# Patient Record
Sex: MALE | Race: WHITE | NOT HISPANIC OR LATINO
[De-identification: names, ages, dates, MRNs, and addresses within clinical notes are randomized per-mention and may not be internally consistent; named-entity substitution may affect disease eponyms.]

---

## 2017-05-04 ENCOUNTER — APPOINTMENT (OUTPATIENT)
Dept: CARDIOLOGY | Facility: CLINIC | Age: 72
End: 2017-05-04

## 2017-05-26 ENCOUNTER — APPOINTMENT (OUTPATIENT)
Dept: CARDIOLOGY | Facility: CLINIC | Age: 72
End: 2017-05-26

## 2017-05-26 ENCOUNTER — OUTPATIENT (OUTPATIENT)
Dept: OUTPATIENT SERVICES | Facility: HOSPITAL | Age: 72
LOS: 1 days | End: 2017-05-26

## 2017-05-26 ENCOUNTER — EMERGENCY (EMERGENCY)
Facility: HOSPITAL | Age: 72
LOS: 1 days | End: 2017-05-26
Payer: OTHER MISCELLANEOUS

## 2017-05-26 PROCEDURE — 99284 EMERGENCY DEPT VISIT MOD MDM: CPT

## 2017-06-30 ENCOUNTER — APPOINTMENT (OUTPATIENT)
Dept: CARDIOLOGY | Facility: CLINIC | Age: 72
End: 2017-06-30

## 2017-07-19 ENCOUNTER — OUTPATIENT (OUTPATIENT)
Dept: OUTPATIENT SERVICES | Facility: HOSPITAL | Age: 72
LOS: 1 days | End: 2017-07-19

## 2017-08-14 ENCOUNTER — OUTPATIENT (OUTPATIENT)
Dept: OUTPATIENT SERVICES | Facility: HOSPITAL | Age: 72
LOS: 1 days | End: 2017-08-14

## 2017-09-01 ENCOUNTER — TRANSCRIPTION ENCOUNTER (OUTPATIENT)
Age: 72
End: 2017-09-01

## 2017-10-04 ENCOUNTER — RECORD ABSTRACTING (OUTPATIENT)
Age: 72
End: 2017-10-04

## 2017-10-04 DIAGNOSIS — N40.0 BENIGN PROSTATIC HYPERPLASIA WITHOUT LOWER URINARY TRACT SYMPMS: ICD-10-CM

## 2017-10-04 DIAGNOSIS — N52.9 MALE ERECTILE DYSFUNCTION, UNSPECIFIED: ICD-10-CM

## 2017-10-04 DIAGNOSIS — F41.9 ANXIETY DISORDER, UNSPECIFIED: ICD-10-CM

## 2017-10-04 DIAGNOSIS — Z84.89 FAMILY HISTORY OF OTHER SPECIFIED CONDITIONS: ICD-10-CM

## 2017-10-04 DIAGNOSIS — Z87.19 PERSONAL HISTORY OF OTHER DISEASES OF THE DIGESTIVE SYSTEM: ICD-10-CM

## 2017-10-04 DIAGNOSIS — Z80.1 FAMILY HISTORY OF MALIGNANT NEOPLASM OF TRACHEA, BRONCHUS AND LUNG: ICD-10-CM

## 2017-10-04 DIAGNOSIS — K21.9 GASTRO-ESOPHAGEAL REFLUX DISEASE W/OUT ESOPHAGITIS: ICD-10-CM

## 2017-10-04 DIAGNOSIS — Z78.9 OTHER SPECIFIED HEALTH STATUS: ICD-10-CM

## 2017-10-04 DIAGNOSIS — J45.909 UNSPECIFIED ASTHMA, UNCOMPLICATED: ICD-10-CM

## 2017-10-04 RX ORDER — MULTIVITAMIN WITH MINERALS
TABLET ORAL DAILY
Refills: 0 | Status: ACTIVE | COMMUNITY

## 2017-11-09 ENCOUNTER — APPOINTMENT (OUTPATIENT)
Dept: CARDIOLOGY | Facility: CLINIC | Age: 72
End: 2017-11-09
Payer: MEDICARE

## 2017-11-09 VITALS
SYSTOLIC BLOOD PRESSURE: 114 MMHG | WEIGHT: 228 LBS | BODY MASS INDEX: 29.26 KG/M2 | HEART RATE: 76 BPM | DIASTOLIC BLOOD PRESSURE: 56 MMHG | HEIGHT: 74 IN

## 2017-11-09 VITALS — SYSTOLIC BLOOD PRESSURE: 108 MMHG | DIASTOLIC BLOOD PRESSURE: 60 MMHG

## 2017-11-09 PROCEDURE — 99214 OFFICE O/P EST MOD 30 MIN: CPT

## 2017-11-17 ENCOUNTER — RX RENEWAL (OUTPATIENT)
Age: 72
End: 2017-11-17

## 2017-11-20 ENCOUNTER — OUTPATIENT (OUTPATIENT)
Dept: OUTPATIENT SERVICES | Facility: HOSPITAL | Age: 72
LOS: 1 days | End: 2017-11-20

## 2018-01-29 ENCOUNTER — OUTPATIENT (OUTPATIENT)
Dept: OUTPATIENT SERVICES | Facility: HOSPITAL | Age: 73
LOS: 1 days | End: 2018-01-29

## 2018-02-16 ENCOUNTER — APPOINTMENT (OUTPATIENT)
Dept: CARDIOLOGY | Facility: CLINIC | Age: 73
End: 2018-02-16
Payer: MEDICARE

## 2018-02-16 VITALS
DIASTOLIC BLOOD PRESSURE: 58 MMHG | SYSTOLIC BLOOD PRESSURE: 120 MMHG | HEIGHT: 74 IN | BODY MASS INDEX: 28.88 KG/M2 | WEIGHT: 225 LBS | HEART RATE: 74 BPM

## 2018-02-16 DIAGNOSIS — R73.03 PREDIABETES.: ICD-10-CM

## 2018-02-16 PROCEDURE — 99214 OFFICE O/P EST MOD 30 MIN: CPT

## 2018-02-17 PROBLEM — R73.03 PRE-DIABETES: Status: ACTIVE | Noted: 2018-02-17

## 2018-02-28 ENCOUNTER — OUTPATIENT (OUTPATIENT)
Dept: OUTPATIENT SERVICES | Facility: HOSPITAL | Age: 73
LOS: 1 days | End: 2018-02-28

## 2018-03-10 ENCOUNTER — OUTPATIENT (OUTPATIENT)
Dept: OUTPATIENT SERVICES | Facility: HOSPITAL | Age: 73
LOS: 1 days | End: 2018-03-10

## 2018-05-09 ENCOUNTER — APPOINTMENT (OUTPATIENT)
Dept: CARDIOLOGY | Facility: CLINIC | Age: 73
End: 2018-05-09
Payer: MEDICARE

## 2018-05-09 PROCEDURE — 93306 TTE W/DOPPLER COMPLETE: CPT

## 2018-05-10 ENCOUNTER — APPOINTMENT (OUTPATIENT)
Dept: CARDIOLOGY | Facility: CLINIC | Age: 73
End: 2018-05-10
Payer: MEDICARE

## 2018-05-10 VITALS
HEIGHT: 74 IN | BODY MASS INDEX: 28.88 KG/M2 | WEIGHT: 225 LBS | SYSTOLIC BLOOD PRESSURE: 110 MMHG | DIASTOLIC BLOOD PRESSURE: 58 MMHG | HEART RATE: 60 BPM

## 2018-05-10 DIAGNOSIS — M19.90 UNSPECIFIED OSTEOARTHRITIS, UNSPECIFIED SITE: ICD-10-CM

## 2018-05-10 DIAGNOSIS — Z86.79 PERSONAL HISTORY OF OTHER DISEASES OF THE CIRCULATORY SYSTEM: ICD-10-CM

## 2018-05-10 DIAGNOSIS — R42 DIZZINESS AND GIDDINESS: ICD-10-CM

## 2018-05-10 PROCEDURE — 99214 OFFICE O/P EST MOD 30 MIN: CPT

## 2018-05-10 PROCEDURE — 93000 ELECTROCARDIOGRAM COMPLETE: CPT

## 2018-05-17 ENCOUNTER — APPOINTMENT (OUTPATIENT)
Dept: CARDIOLOGY | Facility: CLINIC | Age: 73
End: 2018-05-17

## 2018-05-24 ENCOUNTER — RX RENEWAL (OUTPATIENT)
Age: 73
End: 2018-05-24

## 2018-06-12 ENCOUNTER — MEDICATION RENEWAL (OUTPATIENT)
Age: 73
End: 2018-06-12

## 2018-06-26 ENCOUNTER — EMERGENCY (EMERGENCY)
Facility: HOSPITAL | Age: 73
LOS: 1 days | End: 2018-06-26
Payer: MEDICARE

## 2018-06-26 PROCEDURE — 99284 EMERGENCY DEPT VISIT MOD MDM: CPT

## 2018-06-26 PROCEDURE — 71045 X-RAY EXAM CHEST 1 VIEW: CPT | Mod: 26

## 2018-08-03 ENCOUNTER — RX RENEWAL (OUTPATIENT)
Age: 73
End: 2018-08-03

## 2018-08-10 ENCOUNTER — OUTPATIENT (OUTPATIENT)
Dept: OUTPATIENT SERVICES | Facility: HOSPITAL | Age: 73
LOS: 1 days | End: 2018-08-10

## 2018-10-31 ENCOUNTER — MEDICATION RENEWAL (OUTPATIENT)
Age: 73
End: 2018-10-31

## 2018-11-15 ENCOUNTER — APPOINTMENT (OUTPATIENT)
Dept: CARDIOLOGY | Facility: CLINIC | Age: 73
End: 2018-11-15
Payer: MEDICARE

## 2018-11-15 VITALS
WEIGHT: 220 LBS | SYSTOLIC BLOOD PRESSURE: 106 MMHG | BODY MASS INDEX: 28.23 KG/M2 | HEIGHT: 74 IN | HEART RATE: 73 BPM | OXYGEN SATURATION: 98 % | DIASTOLIC BLOOD PRESSURE: 52 MMHG

## 2018-11-15 PROCEDURE — 99214 OFFICE O/P EST MOD 30 MIN: CPT

## 2018-11-15 RX ORDER — NEBIVOLOL HYDROCHLORIDE 5 MG/1
5 TABLET ORAL DAILY
Qty: 90 | Refills: 2 | Status: DISCONTINUED | COMMUNITY
End: 2018-11-15

## 2018-11-15 NOTE — REVIEW OF SYSTEMS
[Feeling Fatigued] : feeling fatigued [Shortness Of Breath] : shortness of breath [Wheezing] : wheezing [see HPI] : see HPI [Anxiety] : anxiety [Under Stress] : under stress [Negative] : Heme/Lymph [Joint Pain] : no joint pain [Dizziness] : no dizziness

## 2018-11-15 NOTE — PHYSICAL EXAM
[General Appearance - Well Developed] : well developed [Normal Appearance] : normal appearance [Well Groomed] : well groomed [General Appearance - Well Nourished] : well nourished [No Deformities] : no deformities [General Appearance - In No Acute Distress] : no acute distress [Normal Conjunctiva] : the conjunctiva exhibited no abnormalities [Eyelids - No Xanthelasma] : the eyelids demonstrated no xanthelasmas [Normal Oral Mucosa] : normal oral mucosa [No Oral Pallor] : no oral pallor [No Oral Cyanosis] : no oral cyanosis [Normal Jugular Venous A Waves Present] : normal jugular venous A waves present [Normal Jugular Venous V Waves Present] : normal jugular venous V waves present [No Jugular Venous Wheat A Waves] : no jugular venous wheat A waves [Respiration, Rhythm And Depth] : normal respiratory rhythm and effort [Exaggerated Use Of Accessory Muscles For Inspiration] : no accessory muscle use [Auscultation Breath Sounds / Voice Sounds] : lungs were clear to auscultation bilaterally [Heart Rate And Rhythm] : heart rate and rhythm were normal [Heart Sounds] : normal S1 and S2 [Murmurs] : no murmurs present [Abdomen Soft] : soft [Abdomen Tenderness] : non-tender [Abdomen Mass (___ Cm)] : no abdominal mass palpated [Abnormal Walk] : normal gait [Gait - Sufficient For Exercise Testing] : the gait was sufficient for exercise testing [Nail Clubbing] : no clubbing of the fingernails [Cyanosis, Localized] : no localized cyanosis [Petechial Hemorrhages (___cm)] : no petechial hemorrhages [Skin Color & Pigmentation] : normal skin color and pigmentation [] : no rash [No Venous Stasis] : no venous stasis [Skin Lesions] : no skin lesions [No Skin Ulcers] : no skin ulcer [No Xanthoma] : no  xanthoma was observed [Oriented To Time, Place, And Person] : oriented to person, place, and time [Affect] : the affect was normal [Mood] : the mood was normal [No Anxiety] : not feeling anxious

## 2018-11-15 NOTE — ASSESSMENT
[FreeTextEntry1] : \par Aortic dilatation 4.2 cm. \par Ectasia of abdominal aorta\par Fhx AAA\par Increased dimensions related to body size note 74 in\par Discussed the diagnosis natural history and pathophysiology of aortic dilatation.\par Follow up echo\par Blood pressure control.\par No strenous isometric exercise\par  \par Hypertensive heart disease. Mild left ventricular hypertrophy. Normal left ventricular function.\par Discussed the diagnosis, natural history, and pathophysiology of hypertension. \par Blood pressure control\par Intolerance to beta blocker\par No contraindication to PDE inhibitor\par Monitor blood pressure at home\par Continue Ramipril \par \par Dyslipidemia with low HDL.\par Aorta atherosclerosis.\par Daily statin therapy. \par Intolerant of aspirin therapy.\par \par Dyspnea\par Exercise test to evaluate CV/blood pressure response to exercise. \par \par Discussed adjunctive lifestyle measures, low-sodium diet, regular aerobic exercise, stress reduction. \par Records requested from [facility]

## 2018-11-15 NOTE — REASON FOR VISIT
[Follow-Up - Clinic] : a clinic follow-up of [Dizziness] : dizziness [Hyperlipidemia] : hyperlipidemia [Hypertension] : hypertension [Medication Management] : Medication management

## 2018-11-15 NOTE — HISTORY OF PRESENT ILLNESS
[FreeTextEntry1] : AMANUEL GARVIN  is a 72 year old  M\par \par retired anesthesiologist, [employer] graduate. \par Prior cardiologist, Dr. Troy Ordonez, [facility]. \par \par h/o TAA, HHD, dyslipidemia, anxiety, atherosclerosis\par \par Episodes of hypertension. Initially diagnosed with high blood pressure 5+ years ago. He is aware of his hypertension when he feels unwell with increase in PVCs, premature beats and an awareness in his chest. \par \par Prior visits to the emergency room and stress test was performed in [facility] which were within normal limits. He does endorse social stress at that time.\par \par At one point he was on ramipril. The dose of ramipril was increased. Subsequently a beta-blocker was added, he had bronchospasm, changed to Bystolic. There is a question of elevated metanephrine for which he underwent further imaging workup \par \par There is no prior history of a clinical myocardial infarction, coronary revascularization, symptomatic congestive heart failure, rheumatic heart disease, atrial fibrillation, cerebrovascular disease or renal insufficiency. \par \par Approximately 4-5 months ago. He was in the emergency room for presumed panic attack including palpitations and the sense of tightness in his chest described as bronchospasm. He is now off the bystolic. \par He does have a sense of wheezing.\par He is scheduled to see Dr. Daniels pulmonology. \par \par He remains physically active. \par There is no orthopnea, lightheadedness, dizziness or syncope.\par \par Blood work of February 2018. HDL 34, LDL 54, , potassium 4.3, creatinine 0.7. \par \par Echocardiogram May 2018. Ejection fraction 60%. Mild left ventricular hypertrophy. Mild ascending aorta dilatation. 4.2 cm. \par \par EKG demonstrates sinus bradycardia with a rightward axis. \par \par Abdominal ultrasound 5/17, no aneurysm. Atherosclerosis throughout abdominal aorta. \par \par Outside stress test [facility]. January 2016. No ischemia.\par

## 2018-11-30 ENCOUNTER — APPOINTMENT (OUTPATIENT)
Dept: CARDIOLOGY | Facility: CLINIC | Age: 73
End: 2018-11-30
Payer: MEDICARE

## 2018-11-30 PROCEDURE — 93308 TTE F-UP OR LMTD: CPT

## 2018-11-30 PROCEDURE — 93015 CV STRESS TEST SUPVJ I&R: CPT

## 2018-12-14 ENCOUNTER — APPOINTMENT (OUTPATIENT)
Dept: CARDIOLOGY | Facility: CLINIC | Age: 73
End: 2018-12-14
Payer: MEDICARE

## 2018-12-14 VITALS
WEIGHT: 213 LBS | SYSTOLIC BLOOD PRESSURE: 96 MMHG | HEART RATE: 82 BPM | DIASTOLIC BLOOD PRESSURE: 60 MMHG | HEIGHT: 74.5 IN | OXYGEN SATURATION: 96 % | BODY MASS INDEX: 27.05 KG/M2

## 2018-12-14 PROCEDURE — 99214 OFFICE O/P EST MOD 30 MIN: CPT

## 2018-12-14 RX ORDER — SILDENAFIL CITRATE 50 MG/1
50 TABLET, FILM COATED ORAL
Refills: 0 | Status: DISCONTINUED | COMMUNITY
End: 2018-12-14

## 2018-12-14 RX ORDER — TADALAFIL 5 MG/1
5 TABLET, FILM COATED ORAL
Refills: 0 | Status: ACTIVE | COMMUNITY

## 2018-12-14 RX ORDER — TAMSULOSIN HYDROCHLORIDE 0.4 MG/1
0.4 CAPSULE ORAL
Refills: 0 | Status: DISCONTINUED | COMMUNITY
End: 2018-12-14

## 2018-12-14 NOTE — ASSESSMENT
[FreeTextEntry1] : \par Aortic dilatation 4.2 cm, repeat echocardiogram reveals root 4.0 cm, Ascending 4.0 cm, and arch 3.6 cm. \par Ectasia of abdominal aorta\par Fhx AAA\par Discussed the diagnosis natural history and pathophysiology of aortic dilatation, there is no evidence of progression currently.\par Continue blood pressure control.  \par No strenous isometric exercise.\par  \par Hypertensive heart disease. Mild left ventricular hypertrophy. Normal left ventricular function.\par Discussed the diagnosis, natural history, and pathophysiology of hypertension. \par BP is relatively low, in the absence of symptoms, the patient would prefer to maintain the same medical regimen. \par Intolerance to beta blocker\par No contraindication to PDE inhibitor\par Monitor blood pressure at home\par Continue Ramipril \par Occasional surveillance of BMP.\par \par Dyslipidemia with low HDL.\par Aorta atherosclerosis.\par Daily statin therapy. \par Intolerant of aspirin therapy.\par \par Dyspnea has improved off of BB.\par Exercise stress test was negative for ischemia, with normotensive BP response. \par \par Discussed adjunctive lifestyle measures, low-sodium diet, regular aerobic exercise, stress reduction. \par \par 6 month follow up.

## 2018-12-14 NOTE — REVIEW OF SYSTEMS
[see HPI] : see HPI [Anxiety] : anxiety [Under Stress] : under stress [Negative] : Heme/Lymph [Feeling Fatigued] : not feeling fatigued [Shortness Of Breath] : no shortness of breath [Wheezing] : no wheezing [Joint Pain] : no joint pain [Dizziness] : no dizziness

## 2018-12-14 NOTE — HISTORY OF PRESENT ILLNESS
[FreeTextEntry1] : AMANUEL GARVIN  is a 72 year old  M\par \par retired anesthesiologist, [employer] graduate. \par Prior cardiologist, Dr. Troy Ordonez, [facility]. \par \par h/o TAA, HHD, dyslipidemia, anxiety, atherosclerosis\par \par Episodes of hypertension. Initially diagnosed with high blood pressure 5+ years ago. He is aware of his hypertension when he feels unwell with increase in PVCs, premature beats and an awareness in his chest. \par \par Prior visits to the emergency room and stress test was performed in [facility] which were within normal limits. He does endorse social stress at that time.\par \par At one point he was on ramipril. The dose of ramipril was increased. Subsequently a beta-blocker was added, he had bronchospasm, changed to Bystolic. There is a question of elevated metanephrine for which he underwent further imaging workup \par \par There is no prior history of a clinical myocardial infarction, coronary revascularization, symptomatic congestive heart failure, rheumatic heart disease, atrial fibrillation, cerebrovascular disease or renal insufficiency. \par \par Approximately 4-5 months ago. He was in the emergency room for presumed panic attack including palpitations and the sense of tightness in his chest described as bronchospasm. He is now off the bystolic with significant improvement in wheezing.\par Will follow with Dr. Daniels pulmonology. \par \par He remains physically active. He has no exertional symptoms. \par There is no orthopnea, lightheadedness, dizziness or syncope.\par \par Blood work of February 2018. HDL 34, LDL 54, , potassium 4.3, creatinine 0.7. \par \par Echocardiogram May 2018. Ejection fraction 60%. Mild left ventricular hypertrophy. Mild ascending aorta dilatation. 4.2 cm. \par \par Repeat echocardiogram on November 30, 2018 revealed stable EF of 60%, aortic dimensions at the root 4 cm, a sitting aorta 4 cm, and aortic arch 3.6 cm.\par \par Exercise treadmill stress test, he exercised 7 minutes and 5 seconds reaching a maximal heart rate of 85%. He remained normotensive throughout the study. There were no significant ischemic EKG changes at peak exercise, overall negative for ischemia.\par \par EKG demonstrates sinus bradycardia with a rightward axis. \par \par Abdominal ultrasound 5/17, no aneurysm. Atherosclerosis throughout abdominal aorta. \par \par Outside stress test [facility]. January 2016. No ischemia.\par

## 2018-12-14 NOTE — ADDENDUM
[FreeTextEntry1] : Please note the patient was seen and examined with MENDOZA Mojica.\serena I was physically present during the service of the patient and personally examined the patient.\serena I was directly involved in the management plan and recommendations of care provided to the patient. \serena I personally reviewed the history and physical exam examination as documented by the PA above.\par

## 2019-01-16 ENCOUNTER — MEDICATION RENEWAL (OUTPATIENT)
Age: 74
End: 2019-01-16

## 2019-01-22 ENCOUNTER — OUTPATIENT (OUTPATIENT)
Dept: OUTPATIENT SERVICES | Facility: HOSPITAL | Age: 74
LOS: 1 days | End: 2019-01-22

## 2019-01-28 ENCOUNTER — RX RENEWAL (OUTPATIENT)
Age: 74
End: 2019-01-28

## 2019-01-30 ENCOUNTER — RX RENEWAL (OUTPATIENT)
Age: 74
End: 2019-01-30

## 2019-01-31 RX ORDER — CELECOXIB 100 MG/1
100 CAPSULE ORAL
Qty: 60 | Refills: 1 | Status: ACTIVE | COMMUNITY
Start: 2017-06-27 | End: 1900-01-01

## 2019-02-15 ENCOUNTER — OUTPATIENT (OUTPATIENT)
Dept: OUTPATIENT SERVICES | Facility: HOSPITAL | Age: 74
LOS: 1 days | End: 2019-02-15

## 2019-03-02 ENCOUNTER — EMERGENCY (EMERGENCY)
Facility: HOSPITAL | Age: 74
LOS: 1 days | End: 2019-03-02
Admitting: EMERGENCY MEDICINE
Payer: MEDICARE

## 2019-03-02 PROCEDURE — 93010 ELECTROCARDIOGRAM REPORT: CPT

## 2019-03-02 PROCEDURE — 71045 X-RAY EXAM CHEST 1 VIEW: CPT | Mod: 26

## 2019-03-02 PROCEDURE — 99285 EMERGENCY DEPT VISIT HI MDM: CPT

## 2019-05-10 RX ORDER — OMEPRAZOLE 20 MG/1
20 CAPSULE, DELAYED RELEASE ORAL
Qty: 90 | Refills: 0 | Status: ACTIVE | COMMUNITY
Start: 2017-10-25 | End: 1900-01-01

## 2019-05-12 ENCOUNTER — RX RENEWAL (OUTPATIENT)
Age: 74
End: 2019-05-12

## 2019-06-06 ENCOUNTER — OUTPATIENT (OUTPATIENT)
Dept: OUTPATIENT SERVICES | Facility: HOSPITAL | Age: 74
LOS: 1 days | End: 2019-06-06

## 2019-06-14 ENCOUNTER — APPOINTMENT (OUTPATIENT)
Dept: CARDIOLOGY | Facility: CLINIC | Age: 74
End: 2019-06-14
Payer: MEDICARE

## 2019-06-14 ENCOUNTER — NON-APPOINTMENT (OUTPATIENT)
Age: 74
End: 2019-06-14

## 2019-06-14 VITALS
HEART RATE: 82 BPM | BODY MASS INDEX: 26.22 KG/M2 | SYSTOLIC BLOOD PRESSURE: 106 MMHG | DIASTOLIC BLOOD PRESSURE: 50 MMHG | WEIGHT: 207 LBS | OXYGEN SATURATION: 96 %

## 2019-06-14 PROCEDURE — 93000 ELECTROCARDIOGRAM COMPLETE: CPT

## 2019-06-14 PROCEDURE — 99214 OFFICE O/P EST MOD 30 MIN: CPT

## 2019-06-14 NOTE — PHYSICAL EXAM
[General Appearance - Well Developed] : well developed [Normal Appearance] : normal appearance [Well Groomed] : well groomed [General Appearance - Well Nourished] : well nourished [No Deformities] : no deformities [Normal Conjunctiva] : the conjunctiva exhibited no abnormalities [General Appearance - In No Acute Distress] : no acute distress [Eyelids - No Xanthelasma] : the eyelids demonstrated no xanthelasmas [Normal Oral Mucosa] : normal oral mucosa [No Oral Pallor] : no oral pallor [No Oral Cyanosis] : no oral cyanosis [Normal Jugular Venous A Waves Present] : normal jugular venous A waves present [Normal Jugular Venous V Waves Present] : normal jugular venous V waves present [No Jugular Venous Wheat A Waves] : no jugular venous wheat A waves [Respiration, Rhythm And Depth] : normal respiratory rhythm and effort [Exaggerated Use Of Accessory Muscles For Inspiration] : no accessory muscle use [Auscultation Breath Sounds / Voice Sounds] : lungs were clear to auscultation bilaterally [Heart Rate And Rhythm] : heart rate and rhythm were normal [Heart Sounds] : normal S1 and S2 [Murmurs] : no murmurs present [Abdomen Soft] : soft [Abdomen Tenderness] : non-tender [Abdomen Mass (___ Cm)] : no abdominal mass palpated [Abnormal Walk] : normal gait [Gait - Sufficient For Exercise Testing] : the gait was sufficient for exercise testing [Nail Clubbing] : no clubbing of the fingernails [Cyanosis, Localized] : no localized cyanosis [Petechial Hemorrhages (___cm)] : no petechial hemorrhages [Skin Color & Pigmentation] : normal skin color and pigmentation [] : no rash [No Venous Stasis] : no venous stasis [Skin Lesions] : no skin lesions [No Skin Ulcers] : no skin ulcer [No Xanthoma] : no  xanthoma was observed [Oriented To Time, Place, And Person] : oriented to person, place, and time [Affect] : the affect was normal [Mood] : the mood was normal [No Anxiety] : not feeling anxious

## 2019-06-14 NOTE — REASON FOR VISIT
[Follow-Up - Clinic] : a clinic follow-up of [Dizziness] : dizziness [Hypertension] : hypertension [Hyperlipidemia] : hyperlipidemia [Medication Management] : Medication management

## 2019-06-16 NOTE — REVIEW OF SYSTEMS
[Feeling Fatigued] : feeling fatigued [Shortness Of Breath] : shortness of breath [Wheezing] : wheezing [see HPI] : see HPI [Anxiety] : anxiety [Under Stress] : under stress [Negative] : Heme/Lymph [Dizziness] : no dizziness [Joint Pain] : no joint pain

## 2019-06-16 NOTE — HISTORY OF PRESENT ILLNESS
[FreeTextEntry1] : AMANUEL GARVIN  is a 73 year old  M\par \par \par \par retired anesthesiologist, [employer] graduate. \par Prior cardiologist, Dr. Troy Ordonez, [facility]. \par \par h/o TAA, HHD, dyslipidemia, anxiety, atherosclerosis\par \par Episodes of hypertension. Initially diagnosed with high blood pressure 5+ years ago. He is aware of his hypertension when he feels unwell with increase in PVCs, premature beats and an awareness in his chest. \par \par Prior visits to the emergency room and stress test was performed in [place] and [place] which were within normal limits. He does endorse social stress at that time.\par \par At one point he was on ramipril. The dose of ramipril was increased. Subsequently a beta-blocker was added, he had bronchospasm, changed to Bystolic. There is a question of elevated metanephrine for which he underwent further imaging workup \par \par There is no prior history of a clinical myocardial infarction, coronary revascularization, symptomatic congestive heart failure, rheumatic heart disease, atrial fibrillation, cerebrovascular disease or renal insufficiency. \par \par Previously seen in the emergency room for presumed panic attack including palpitations and the sense of tightness in his chest described as bronchospasm. He is now off the bystolic. \par \par He remains physically active.  He walks\par There is no orthopnea, lightheadedness, dizziness or syncope.\par On my examination, his blood pressure is 108/64. \par Plans for review of his adrenal at [facility].\par \par EKG shows normal sinus rhythm with a right axis deviation, and possible inferior MI. \par \par Echocardiogram November 2018. Normal left ventricular function. Ascending aorta, 4 cm. Aortic root 4 cm.  \par Exercise tolerance test. Exercise 7 minutes. Peak heart rate 132. Normal cardiovascular response. No ischemia. \par February 2019. Potassium 4.2, creatinine 0.7, total cholesterol 118, LDL 58\par Echocardiogram May 2018. Ejection fraction 60%. Mild left ventricular hypertrophy. Mild ascending aorta dilatation. 4.2 cm. \par Abdominal ultrasound 5/17, no aneurysm. Atherosclerosis throughout abdominal aorta. \par Outside stress test [facility]. January 2016. No ischemia.\par

## 2019-06-16 NOTE — ASSESSMENT
[FreeTextEntry1] : Aortic dilatation 4.2 cm. \par Ectasia of abdominal aorta\par Fhx AAA\par Increased dimensions related to body size note 74 in\par Discussed the diagnosis natural history and pathophysiology of aortic dilatation.\par Follow up echo\par Blood pressure control.\par No strenous isometric exercise\par  \par Hypertensive heart disease. Mild left ventricular hypertrophy. Normal left ventricular function.\par Blood pressure control\par Intolerance to beta blocker\par No contraindication to PDE inhibitor\par Monitor blood pressure at home\par Continue Ramipril \par \par Dyslipidemia with low HDL.\par Aorta atherosclerosis.\par Daily statin therapy. \par Intolerant of aspirin therapy.\par \par Discussed adjunctive lifestyle measures, low-sodium diet, regular aerobic exercise, stress reduction. \par [facility] from [facility]\par

## 2019-06-18 ENCOUNTER — TRANSCRIPTION ENCOUNTER (OUTPATIENT)
Age: 74
End: 2019-06-18

## 2019-06-18 ENCOUNTER — OUTPATIENT (OUTPATIENT)
Dept: OUTPATIENT SERVICES | Facility: HOSPITAL | Age: 74
LOS: 1 days | End: 2019-06-18

## 2019-09-02 PROBLEM — Z84.89 FAMILY HISTORY OF DEATH OF UNKNOWN CAUSE: Status: ACTIVE | Noted: 2017-10-04

## 2019-09-18 ENCOUNTER — OUTPATIENT (OUTPATIENT)
Dept: OUTPATIENT SERVICES | Facility: HOSPITAL | Age: 74
LOS: 1 days | End: 2019-09-18

## 2019-11-06 ENCOUNTER — RECORD ABSTRACTING (OUTPATIENT)
Age: 74
End: 2019-11-06

## 2019-11-08 ENCOUNTER — APPOINTMENT (OUTPATIENT)
Dept: CARDIOLOGY | Facility: CLINIC | Age: 74
End: 2019-11-08
Payer: MEDICARE

## 2019-11-08 VITALS
BODY MASS INDEX: 27.94 KG/M2 | OXYGEN SATURATION: 97 % | HEART RATE: 77 BPM | WEIGHT: 220 LBS | HEIGHT: 74.5 IN | SYSTOLIC BLOOD PRESSURE: 120 MMHG | DIASTOLIC BLOOD PRESSURE: 68 MMHG

## 2019-11-08 PROCEDURE — 99214 OFFICE O/P EST MOD 30 MIN: CPT

## 2019-11-08 NOTE — PHYSICAL EXAM
[General Appearance - Well Developed] : well developed [Normal Appearance] : normal appearance [Well Groomed] : well groomed [No Deformities] : no deformities [General Appearance - Well Nourished] : well nourished [General Appearance - In No Acute Distress] : no acute distress [Normal Conjunctiva] : the conjunctiva exhibited no abnormalities [Eyelids - No Xanthelasma] : the eyelids demonstrated no xanthelasmas [Normal Oral Mucosa] : normal oral mucosa [No Oral Cyanosis] : no oral cyanosis [No Oral Pallor] : no oral pallor [Normal Jugular Venous A Waves Present] : normal jugular venous A waves present [Normal Jugular Venous V Waves Present] : normal jugular venous V waves present [No Jugular Venous Wheat A Waves] : no jugular venous wheat A waves [Respiration, Rhythm And Depth] : normal respiratory rhythm and effort [Exaggerated Use Of Accessory Muscles For Inspiration] : no accessory muscle use [Heart Rate And Rhythm] : heart rate and rhythm were normal [Auscultation Breath Sounds / Voice Sounds] : lungs were clear to auscultation bilaterally [Heart Sounds] : normal S1 and S2 [Murmurs] : no murmurs present [Abdomen Soft] : soft [Abdomen Tenderness] : non-tender [Abnormal Walk] : normal gait [Abdomen Mass (___ Cm)] : no abdominal mass palpated [Gait - Sufficient For Exercise Testing] : the gait was sufficient for exercise testing [Nail Clubbing] : no clubbing of the fingernails [Petechial Hemorrhages (___cm)] : no petechial hemorrhages [Cyanosis, Localized] : no localized cyanosis [Skin Color & Pigmentation] : normal skin color and pigmentation [No Venous Stasis] : no venous stasis [] : no rash [No Skin Ulcers] : no skin ulcer [Skin Lesions] : no skin lesions [No Xanthoma] : no  xanthoma was observed [Oriented To Time, Place, And Person] : oriented to person, place, and time [Affect] : the affect was normal [Mood] : the mood was normal [No Anxiety] : not feeling anxious

## 2019-11-09 NOTE — ASSESSMENT
[FreeTextEntry1] : Aortic dilatation 4.2 cm. \par Ectasia of abdominal aorta\par Fhx AAA\par Increased dimensions related to body size note 74 in\par Discussed the diagnosis natural history and pathophysiology of aortic dilatation.\par Follow up echo\par Blood pressure control.\par No strenous isometric exercise\par  \par Hypertensive heart disease. Mild left ventricular hypertrophy. Normal left ventricular function.\par Blood pressure control\par Intolerance to beta blocker\par No contraindication to PDE inhibitor\par Monitor blood pressure at home\par Continue Ramipril \par \par Dyslipidemia with low HDL.\par Aorta atherosclerosis.\par Daily statin therapy. \par Intolerant of aspirin therapy.\par \par Discussed adjunctive lifestyle measures, low-sodium diet, regular aerobic exercise, stress reduction. \par Followup echocardiogram and carotid Doppler. \par Blood work has been requested.\par

## 2019-11-09 NOTE — HISTORY OF PRESENT ILLNESS
[FreeTextEntry1] : DRLg AMANUEL GARVIN  is a 73 year old  M\par \par \par retired anesthesiologist, [employer] graduate. \par Prior cardiologist, Dr. Troy Ordonez, [facility]. \par \par h/o TAA, HHD, dyslipidemia, anxiety, atherosclerosis\par \par Episodes of hypertension. Initially diagnosed with high blood pressure 5+ years ago. He is aware of his hypertension when he feels unwell with increase in PVCs, premature beats and an awareness in his chest. \par \par Prior visits to the emergency room and stress test was performed in [place] and [place] which were within normal limits. He does endorse social stress at that time.\par \par At one point he was on ramipril. The dose of ramipril was increased. Subsequently a beta-blocker was added, he had bronchospasm, changed to Bystolic. There is a question of elevated metanephrine for which he underwent further imaging workup \par \par There is no prior history of a clinical myocardial infarction, coronary revascularization, symptomatic congestive heart failure, rheumatic heart disease, atrial fibrillation, cerebrovascular disease or renal insufficiency. \par \par Previously seen in the emergency room for presumed panic attack including palpitations and the sense of tightness in his chest described as bronchospasm. He is now off the bystolic. \par \par He remains physically active.  He walks\par There is no orthopnea, lightheadedness, dizziness or syncope.\par On my examination, nlood pressure is 108/65\par \par EKG shows normal sinus rhythm with a right axis deviation, and possible inferior MI. \par \par Echocardiogram November 2018. Normal left ventricular function. Ascending aorta, 4 cm. Aortic root 4 cm.  \par Exercise tolerance test. Exercise 7 minutes. Peak heart rate 132. Normal cardiovascular response. No ischemia. \par February 2019. Potassium 4.2, creatinine 0.7, total cholesterol 118, LDL 58\par Echocardiogram May 2018. Ejection fraction 60%. Mild left ventricular hypertrophy. Mild ascending aorta dilatation. 4.2 cm. \par Abdominal ultrasound 5/17, no aneurysm. Atherosclerosis throughout abdominal aorta. \par Outside stress test [facility]. January 2016. No ischemia.\par

## 2019-11-11 ENCOUNTER — OUTPATIENT (OUTPATIENT)
Dept: OUTPATIENT SERVICES | Facility: HOSPITAL | Age: 74
LOS: 1 days | End: 2019-11-11

## 2019-11-22 ENCOUNTER — APPOINTMENT (OUTPATIENT)
Dept: CARDIOLOGY | Facility: CLINIC | Age: 74
End: 2019-11-22
Payer: MEDICARE

## 2019-11-22 PROCEDURE — 93306 TTE W/DOPPLER COMPLETE: CPT

## 2019-11-22 PROCEDURE — 93880 EXTRACRANIAL BILAT STUDY: CPT

## 2019-11-26 ENCOUNTER — MEDICATION RENEWAL (OUTPATIENT)
Age: 74
End: 2019-11-26

## 2019-12-02 ENCOUNTER — APPOINTMENT (OUTPATIENT)
Dept: CARDIOLOGY | Facility: CLINIC | Age: 74
End: 2019-12-02

## 2019-12-10 ENCOUNTER — OUTPATIENT (OUTPATIENT)
Dept: OUTPATIENT SERVICES | Facility: HOSPITAL | Age: 74
LOS: 1 days | End: 2019-12-10

## 2020-04-28 ENCOUNTER — APPOINTMENT (OUTPATIENT)
Dept: CARDIOLOGY | Facility: CLINIC | Age: 75
End: 2020-04-28
Payer: MEDICARE

## 2020-04-28 VITALS
WEIGHT: 198 LBS | HEART RATE: 88 BPM | SYSTOLIC BLOOD PRESSURE: 140 MMHG | DIASTOLIC BLOOD PRESSURE: 69 MMHG | BODY MASS INDEX: 25.14 KG/M2 | HEIGHT: 74.5 IN

## 2020-04-28 PROCEDURE — 99213 OFFICE O/P EST LOW 20 MIN: CPT | Mod: 95

## 2020-04-29 RX ORDER — RAMIPRIL 10 MG/1
10 CAPSULE ORAL DAILY
Qty: 180 | Refills: 1 | Status: DISCONTINUED | COMMUNITY
Start: 2020-01-17 | End: 2020-04-29

## 2020-04-29 NOTE — REVIEW OF SYSTEMS
[Feeling Fatigued] : feeling fatigued [Shortness Of Breath] : shortness of breath [Wheezing] : wheezing [Anxiety] : anxiety [Under Stress] : under stress [see HPI] : see HPI [Negative] : Heme/Lymph [Joint Pain] : no joint pain [Dizziness] : no dizziness

## 2020-04-29 NOTE — ASSESSMENT
[FreeTextEntry1] : \par Aortic dilatation 4.2 cm. \par Ectasia of abdominal aorta\par Fhx AAA\par Increased dimensions related to body size note 74 in\par Discussed the diagnosis natural history and pathophysiology of aortic dilatation.\par Follow up echo\par Blood pressure control.\par No strenuous isometric exercise\par  \par Hypertensive heart disease. Mild left ventricular hypertrophy. Normal left ventricular function.\par Blood pressure control\par Intolerance to beta blocker\par No contraindication to PDE inhibitor\par Monitor blood pressure at home\par Follow-up blood work has been requested to monitor potassium\par \par Dyslipidemia with low HDL.\par Aorta atherosclerosis.\par Daily statin therapy. \par Intolerant of aspirin therapy.\par \par Discussed adjunctive lifestyle measures, low-sodium diet, regular aerobic exercise, stress reduction. \par Follow-up COVID testing.

## 2020-04-29 NOTE — HISTORY OF PRESENT ILLNESS
[Home] : at home, [unfilled] , at the time of the visit. [Medical Office: ([facility])___] : at the medical office located in  [FreeTextEntry1] : Virtual visit (video)\par Consent: Patient consented to virtual video video visit.\par Time was spent in review of the pertinent medical records, discussion with the patient, evaluation of the patient problem, and coordination of a care plan as part of this online visit. \par No physical exam was performed as this visit was performed virtually with exceptions as noted below. 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 \par Prior cardiologist, Dr. Troy Ordonez, [facility]. \par \par h/o TAA, HHD, dyslipidemia, anxiety, atherosclerosis\par \par Episodes of hypertension. Initially diagnosed with high blood pressure 5+ years ago. He is aware of his hypertension when he feels unwell with increase in PVCs, premature beats and an awareness in his chest. \par \par Prior visits to the emergency room and stress test was performed in [place] and [place] which were within normal limits. He does endorse social stress at that time.\par \par At one point he was on ramipril. The dose of ramipril was increased. Subsequently a beta-blocker was added, he had bronchospasm, changed to Bystolic. There is a question of elevated metanephrine for which he underwent further workup \par \par There is no prior history of a clinical myocardial infarction, coronary revascularization, symptomatic congestive heart failure, rheumatic heart disease, atrial fibrillation, cerebrovascular disease or renal insufficiency. \par \par Previously seen in the emergency room for presumed panic attack including palpitations and the sense of tightness in his chest described as bronchospasm. He is now off the bystolic. \par \par He remains physically active. He walks\par There is no orthopnea, lightheadedness, dizziness or syncope.\par \par He has felt unwell with low-grade fever and muscle aches. Plans to contact primary physician regarding COVID testing. \par Requesting to go of ramipril due to possible COVID infection. Discussed unknown risk-benefit ratio. Discussed possible change to angiotensin receptor blocker or calcium channel blocker. \par He spoke with the medical school classmate and cardiologist who recommended chlorthalidone.\par \par November 2019 potassium 4.4 creatinine 0.7 total cholesterol 110 LDL 48 \par Echocardiogram November 2019 normal left ventricular function aortic root 3.9 ascending aorta 3.9 \par Carotid Doppler November 2019 mild nonobstructive disease bilaterally\par EKG 6/19 normal sinus rhythm with a right axis deviation, and possible inferior MI. \par Exercise tolerance test 11/18. Exercise 7 minutes. Peak heart rate 132. Normal cardiovascular response. No ischemia. \par Abdominal ultrasound 5/17, no aneurysm. Atherosclerosis throughout abdominal aorta. \par Outside stress test [facility]. January 2016. No ischemia.\par

## 2020-04-29 NOTE — PHYSICAL EXAM
[General Appearance - Well Developed] : well developed [Normal Appearance] : normal appearance [General Appearance - Well Nourished] : well nourished [Well Groomed] : well groomed [No Deformities] : no deformities [General Appearance - In No Acute Distress] : no acute distress [Affect] : the affect was normal [Oriented To Time, Place, And Person] : oriented to person, place, and time [Mood] : the mood was normal [No Anxiety] : not feeling anxious [] : no respiratory distress [Exaggerated Use Of Accessory Muscles For Inspiration] : no accessory muscle use

## 2020-05-11 RX ORDER — CHLORTHALIDONE 25 MG/1
25 TABLET ORAL
Qty: 90 | Refills: 0 | Status: DISCONTINUED | COMMUNITY
Start: 2020-04-29 | End: 2020-05-11

## 2020-07-24 ENCOUNTER — APPOINTMENT (OUTPATIENT)
Dept: CARDIOLOGY | Facility: CLINIC | Age: 75
End: 2020-07-24
Payer: MEDICARE

## 2020-07-24 ENCOUNTER — NON-APPOINTMENT (OUTPATIENT)
Age: 75
End: 2020-07-24

## 2020-07-24 VITALS
TEMPERATURE: 97.8 F | DIASTOLIC BLOOD PRESSURE: 58 MMHG | SYSTOLIC BLOOD PRESSURE: 98 MMHG | HEART RATE: 92 BPM | BODY MASS INDEX: 27.17 KG/M2 | HEIGHT: 74.5 IN | OXYGEN SATURATION: 98 % | WEIGHT: 214 LBS

## 2020-07-24 DIAGNOSIS — Z78.9 OTHER SPECIFIED HEALTH STATUS: ICD-10-CM

## 2020-07-24 DIAGNOSIS — Z00.00 ENCOUNTER FOR GENERAL ADULT MEDICAL EXAMINATION W/OUT ABNORMAL FINDINGS: ICD-10-CM

## 2020-07-24 PROCEDURE — 99214 OFFICE O/P EST MOD 30 MIN: CPT

## 2020-07-24 NOTE — REVIEW OF SYSTEMS
[Feeling Fatigued] : feeling fatigued [Shortness Of Breath] : shortness of breath [Wheezing] : wheezing [Anxiety] : anxiety [see HPI] : see HPI [Under Stress] : under stress [Negative] : Heme/Lymph [Dizziness] : no dizziness [Joint Pain] : no joint pain

## 2020-07-24 NOTE — HISTORY OF PRESENT ILLNESS
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 \par Prior cardiologist, Dr. Troy Ordonez, [facility]. \par \par h/o TAA, HHD, dyslipidemia, anxiety, atherosclerosis\par \par Episodes of hypertension. Initially diagnosed with high blood pressure 5+ years ago. He is aware of his hypertension when he feels unwell with increase in PVCs, premature beats and an awareness in his chest. \par \par Prior visits to the emergency room and stress test was performed in [place] and [place] which were within normal limits. He does endorse social stress at that time.\par \par At one point he was on ramipril. The dose of ramipril was increased. Subsequently a beta-blocker was added, he had bronchospasm, changed to Bystolic. There is a question of elevated metanephrine for which he underwent further workup \par \par There is no prior history of a clinical myocardial infarction, coronary revascularization, symptomatic congestive heart failure, rheumatic heart disease, atrial fibrillation, cerebrovascular disease or renal insufficiency. \par \par Previously seen in the emergency room for presumed panic attack including palpitations and the sense of tightness in his chest described as bronchospasm. He is now off the bystolic. \par \par He remains physically active.\par There is no orthopnea, lightheadedness, dizziness or syncope.\par He had a negative antibody test.  \par He walks daily.  \par On my examination his blood pressure is 104/65. \par \par Takes ramipril 20 mg a day and atorvastatin 20 mg a day.  \par EKG demonstrates normal sinus rhythm with a right axis deviation and nonspecific ST changes.  \par \par \par May 2020 potassium 3.9 creatinine 0.7\par November 2019 potassium 4.4 creatinine 0.7 total cholesterol 110 LDL 48 \par Echocardiogram November 2019 normal left ventricular function aortic root 3.9 ascending aorta 3.9 \par Carotid Doppler November 2019 mild nonobstructive disease bilaterally\par EKG 6/19 normal sinus rhythm with a right axis deviation, and possible inferior MI. \par Exercise tolerance test 11/18. Exercise 7 minutes. Peak heart rate 132. Normal cardiovascular response. No ischemia. \par Abdominal ultrasound 5/17, no aneurysm. Atherosclerosis throughout abdominal aorta. \par Outside stress test [facility]. January 2016. No ischemia.\par \par

## 2020-07-24 NOTE — PHYSICAL EXAM
[General Appearance - Well Developed] : well developed [Normal Appearance] : normal appearance [Well Groomed] : well groomed [General Appearance - Well Nourished] : well nourished [No Deformities] : no deformities [General Appearance - In No Acute Distress] : no acute distress [Eyelids - No Xanthelasma] : the eyelids demonstrated no xanthelasmas [Normal Conjunctiva] : the conjunctiva exhibited no abnormalities [Normal Oral Mucosa] : normal oral mucosa [No Oral Pallor] : no oral pallor [No Oral Cyanosis] : no oral cyanosis [Normal Jugular Venous A Waves Present] : normal jugular venous A waves present [Normal Jugular Venous V Waves Present] : normal jugular venous V waves present [No Jugular Venous Wheat A Waves] : no jugular venous wheat A waves [Exaggerated Use Of Accessory Muscles For Inspiration] : no accessory muscle use [Respiration, Rhythm And Depth] : normal respiratory rhythm and effort [Auscultation Breath Sounds / Voice Sounds] : lungs were clear to auscultation bilaterally [Heart Rate And Rhythm] : heart rate and rhythm were normal [Murmurs] : no murmurs present [Heart Sounds] : normal S1 and S2 [Abdomen Soft] : soft [Abdomen Tenderness] : non-tender [Abdomen Mass (___ Cm)] : no abdominal mass palpated [Abnormal Walk] : normal gait [Gait - Sufficient For Exercise Testing] : the gait was sufficient for exercise testing [Nail Clubbing] : no clubbing of the fingernails [Cyanosis, Localized] : no localized cyanosis [Petechial Hemorrhages (___cm)] : no petechial hemorrhages [No Venous Stasis] : no venous stasis [] : no rash [Skin Color & Pigmentation] : normal skin color and pigmentation [Skin Lesions] : no skin lesions [No Xanthoma] : no  xanthoma was observed [No Skin Ulcers] : no skin ulcer [Oriented To Time, Place, And Person] : oriented to person, place, and time [Affect] : the affect was normal [Mood] : the mood was normal [No Anxiety] : not feeling anxious

## 2020-07-24 NOTE — ASSESSMENT
[FreeTextEntry1] : \par Aortic dilatation 4.2 cm. \par Ectasia of abdominal aorta\par Fhx AAA\par Increased dimensions related to body size note 74 in\par Discussed the diagnosis natural history and pathophysiology of aortic dilatation.\par Follow up echo\par Blood pressure control.\par No strenuous isometric exercise\par  \par Hypertensive heart disease. Mild left ventricular hypertrophy. Normal left ventricular function.\par Blood pressure control\par Intolerance to beta blocker\par No contraindication to PDE inhibitor\par Monitor blood pressure at home\par \par Dyslipidemia with low HDL.\par Aorta atherosclerosis.\par Daily statin therapy. \par Intolerant of aspirin therapy.\par \par Discussed adjunctive lifestyle measures, low-sodium diet, regular aerobic exercise, stress reduction. \par Follow-up echocardiogram and abdominal ultrasound have been requested. \par Clinical follow-up will be scheduled after requested ultrasound imaging and blood work.\par

## 2020-08-20 ENCOUNTER — APPOINTMENT (OUTPATIENT)
Dept: CT IMAGING | Facility: CLINIC | Age: 75
End: 2020-08-20
Payer: MEDICARE

## 2020-08-20 PROCEDURE — Q9967B: CUSTOM

## 2020-08-20 PROCEDURE — 74178 CT ABD&PLV WO CNTR FLWD CNTR: CPT

## 2020-09-24 ENCOUNTER — APPOINTMENT (OUTPATIENT)
Dept: CARDIOLOGY | Facility: CLINIC | Age: 75
End: 2020-09-24
Payer: MEDICARE

## 2020-09-24 PROCEDURE — 93979 VASCULAR STUDY: CPT

## 2020-09-24 PROCEDURE — 93306 TTE W/DOPPLER COMPLETE: CPT

## 2020-10-02 ENCOUNTER — NON-APPOINTMENT (OUTPATIENT)
Age: 75
End: 2020-10-02

## 2020-10-02 ENCOUNTER — APPOINTMENT (OUTPATIENT)
Dept: CARDIOLOGY | Facility: CLINIC | Age: 75
End: 2020-10-02
Payer: MEDICARE

## 2020-10-02 VITALS
WEIGHT: 208 LBS | BODY MASS INDEX: 26.41 KG/M2 | SYSTOLIC BLOOD PRESSURE: 118 MMHG | HEART RATE: 83 BPM | DIASTOLIC BLOOD PRESSURE: 70 MMHG | OXYGEN SATURATION: 97 % | HEIGHT: 74.5 IN

## 2020-10-02 PROCEDURE — 99214 OFFICE O/P EST MOD 30 MIN: CPT

## 2020-10-02 PROCEDURE — 93000 ELECTROCARDIOGRAM COMPLETE: CPT

## 2020-10-05 NOTE — HISTORY OF PRESENT ILLNESS
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 \par Prior cardiologist, Dr. Troy Ordonez, [facility]. \par \par h/o TAA, HHD, dyslipidemia, anxiety, atherosclerosis\par \par Episodes of hypertension. Initially diagnosed with high blood pressure 5+ years ago. He is aware of his hypertension when he feels unwell with increase in PVCs, premature beats and an awareness in his chest. \par \par Prior visits to the emergency room and stress test was performed in [place] and [place] which were within normal limits. He does endorse social stress at that time.\par \par At one point he was on ramipril. The dose of ramipril was increased. Subsequently a beta-blocker was added, he had bronchospasm, changed to Bystolic. There is a question of elevated metanephrine for which he underwent further workup \par \par There is no prior history of a clinical myocardial infarction, coronary revascularization, symptomatic congestive heart failure, rheumatic heart disease, atrial fibrillation, cerebrovascular disease or renal insufficiency. \par \par Previously seen in the emergency room for presumed panic attack including palpitations and the sense of tightness in his chest described as bronchospasm. He is now off the bystolic. \par \par He remains physically active.\par There is no orthopnea, lightheadedness, dizziness or syncope.\par He had a negative antibody test.  \par He walks daily.  \par \par Reports recently his blood pressure has been more elevated he is taking up to 30 mg of ramipril \par previously had bronchospasm with beta-blocker \par \par Abdominal ultrasound September 2020 no aneurysm mild to moderate atherosclerosis \par Echocardiogram September 2020 normal left ventricular function aortic root 4 cm\par \par May 2020 potassium 3.9 creatinine 0.7\par November 2019 potassium 4.4 creatinine 0.7 total cholesterol 110 LDL 48 \par Carotid Doppler November 2019 mild nonobstructive disease bilaterally\par Exercise tolerance test 11/18. Exercise 7 minutes. Peak heart rate 132. Normal cardiovascular response. No ischemia. \par Outside stress test [facility]. January 2016. No ischemia.

## 2020-10-05 NOTE — ASSESSMENT
[FreeTextEntry1] : Aortic dilatation 4.2 cm. \par Ectasia of abdominal aorta\par Fhx AAA\par Increased dimensions related to body size note 74 in\par Discussed the diagnosis natural history and pathophysiology of aortic dilatation.\par Stablle\par Blood pressure control.\par No strenuous isometric exercise\par  \par Hypertensive heart disease. Mild left ventricular hypertrophy. Normal left ventricular function.\par Blood pressure control\par Intolerance to beta blocker\par No contraindication to PDE inhibitor\par Monitor blood pressure at home\par \par Dyslipidemia with low HDL.\par Aorta atherosclerosis.\par Daily statin therapy. \par Intolerant of aspirin therapy.\par \par Discussed adjunctive lifestyle measures, low-sodium diet, regular aerobic exercise, stress reduction. \par \par recommend addition of amlodipine monitor blood pressure at home \par will call in 1 week to assess blood pressure and heart rate response\par Follow-up blood work has been requested \par \par

## 2020-11-10 ENCOUNTER — APPOINTMENT (OUTPATIENT)
Dept: CARDIOLOGY | Facility: CLINIC | Age: 75
End: 2020-11-10

## 2021-01-08 ENCOUNTER — APPOINTMENT (OUTPATIENT)
Dept: CARDIOLOGY | Facility: CLINIC | Age: 76
End: 2021-01-08
Payer: MEDICARE

## 2021-01-08 VITALS
OXYGEN SATURATION: 98 % | HEART RATE: 82 BPM | DIASTOLIC BLOOD PRESSURE: 66 MMHG | BODY MASS INDEX: 26.31 KG/M2 | SYSTOLIC BLOOD PRESSURE: 138 MMHG | TEMPERATURE: 97.1 F | WEIGHT: 205 LBS | HEIGHT: 74 IN

## 2021-01-08 PROCEDURE — 99214 OFFICE O/P EST MOD 30 MIN: CPT

## 2021-01-08 RX ORDER — AMLODIPINE BESYLATE 5 MG/1
5 TABLET ORAL DAILY
Qty: 90 | Refills: 3 | Status: ACTIVE | COMMUNITY
Start: 2020-10-02 | End: 1900-01-01

## 2021-01-08 NOTE — REVIEW OF SYSTEMS
[Feeling Fatigued] : feeling fatigued [see HPI] : see HPI [Anxiety] : anxiety [Under Stress] : under stress [Negative] : Heme/Lymph [Chest Pain] : chest pain [Palpitations] : palpitations

## 2021-01-08 NOTE — REASON FOR VISIT
[Follow-Up - Clinic] : a clinic follow-up of [Hyperlipidemia] : hyperlipidemia [Hypertension] : hypertension [Medication Management] : Medication management [Chest Pain] : chest pain

## 2021-01-14 NOTE — ASSESSMENT
[FreeTextEntry1] : Aortic dilatation 4.2 cm. \par Ectasia of abdominal aorta\par Fhx AAA\par Increased dimensions related to body size note 74 in\par Discussed the diagnosis natural history and pathophysiology of aortic dilatation.\par Stable\par Blood pressure control.\par No strenuous isometric exercise\par  \par Hypertensive heart disease. Mild left ventricular hypertrophy. Normal left ventricular function.\par Blood pressure control\par Intolerance to beta blocker\par No contraindication to PDE inhibitor\par Monitor blood pressure at home\par clinical improvement in blood pressure with amlodipine \par \par Dyslipidemia with low HDL.\par Coronary/Aorta atherosclerosis.\par reviewed outside CT scan \par Daily statin therapy. \par increase atorvastatin \par Intolerant of aspirin therapy.\par \par Discussed adjunctive lifestyle measures, low-sodium diet, regular aerobic exercise, stress reduction. \par Follow-up blood work has been requested

## 2021-01-14 NOTE — HISTORY OF PRESENT ILLNESS
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 \par Prior cardiologist, Dr. Troy Ordonez, [facility]. \par \par h/o TAA, HHD, dyslipidemia, anxiety, atherosclerosis\par \par Episodes of hypertension. Initially diagnosed with high blood pressure 5+ years ago. He is aware of his hypertension when he feels unwell with increase in PVCs,\par \par Prior visits to the emergency room and stress test was performed in [facility] which were within normal limits. He does endorse social stress at that time.\par \par At one point he was on ramipril. The dose of ramipril was increased. Subsequently a beta-blocker was added, he had bronchospasm, changed to Bystolic. \par \par There is no prior history of a clinical myocardial infarction, coronary revascularization, symptomatic congestive heart failure, rheumatic heart disease, atrial fibrillation, cerebrovascular disease or renal insufficiency. \par \par Previously seen in the emergency room for presumed panic attack including palpitations and the sense of tightness in his chest described as bronchospasm. He is now off the bystolic. \par \par remains physically active.\par There is no orthopnea, lightheadedness, dizziness or syncope.\par walks daily.  \par \par he had an episode of chest pain he described this as severe he was seen in [place] ER he ruled out for myocardial infarction \par in retrospect his symptoms are likely related to pericarditis and CT was notable for moderate cor calcification \par there was a period where he was aware of ventricular ectopy that subsequently has resolved \par on my examination his blood pressure is 106/62 \par \par CTA with nonobstructive disease moderate coronary calcification aorta 4 cm\par Blood work October 2020 elevated metanephrine was seen at [place] [facility] told unremarkable \par last cholesterol 99 LDL 54 HDL 38 \par Abdominal ultrasound September 2020 no aneurysm mild to moderate atherosclerosis \par Echocardiogram September 2020 normal left ventricular function aortic root 4 cm\par May 2020 potassium 3.9 creatinine 0.7\par Carotid Doppler November 2019 mild nonobstructive disease bilaterally\par Exercise tolerance test 11/18. Exercise 7 minutes. Peak heart rate 132. Normal cardiovascular response. No ischemia. \par Outside stress test [facility]. January 2016. No ischemia.\par

## 2021-03-05 ENCOUNTER — NON-APPOINTMENT (OUTPATIENT)
Age: 76
End: 2021-03-05

## 2021-03-05 ENCOUNTER — APPOINTMENT (OUTPATIENT)
Dept: CARDIOLOGY | Facility: CLINIC | Age: 76
End: 2021-03-05
Payer: MEDICARE

## 2021-03-05 VITALS
DIASTOLIC BLOOD PRESSURE: 60 MMHG | SYSTOLIC BLOOD PRESSURE: 102 MMHG | OXYGEN SATURATION: 99 % | HEART RATE: 65 BPM | HEIGHT: 74 IN | WEIGHT: 210 LBS | BODY MASS INDEX: 26.95 KG/M2 | TEMPERATURE: 97.3 F

## 2021-03-05 DIAGNOSIS — Z87.898 PERSONAL HISTORY OF OTHER SPECIFIED CONDITIONS: ICD-10-CM

## 2021-03-05 DIAGNOSIS — I77.811 ABDOMINAL AORTIC ECTASIA: ICD-10-CM

## 2021-03-05 DIAGNOSIS — R07.89 OTHER CHEST PAIN: ICD-10-CM

## 2021-03-05 PROCEDURE — 99214 OFFICE O/P EST MOD 30 MIN: CPT

## 2021-03-05 PROCEDURE — 93000 ELECTROCARDIOGRAM COMPLETE: CPT

## 2021-03-05 NOTE — HISTORY OF PRESENT ILLNESS
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 \par Prior cardiologist, Dr. Troy Ordonez, [facility]. \par \par h/o TAA, HHD, dyslipidemia, anxiety, nonobstructive CAC and aortic atherosclerosis on medical therapy. \par \par There has been no recent illness or hospitalization. He requires greenlight laser prostate procedure with biopsy. Overall he is active, biking every day at home, denies exertional chest pain, pressure, dyspnea, palps, dizziness, or syncope. \par \par EKG today 3/5/21 shows normal sinus rhythm, no sig changes. \par Echo 10/2020 normal LV systolic function\par ETT 2018 no evidence of ischemia. \par \par Episodes of hypertension. Initially diagnosed with high blood pressure 5+ years ago. He is aware of his hypertension when he feels unwell with increase in PVCs. Asx from arrhythmia standpoint at this time. BP is very well controlled. \par \par Prior visits to the emergency room and stress test was performed in [facility] which were within normal limits. He does endorse social stress at that time.\par \par At one point he was on ramipril. The dose of ramipril was increased. Subsequently a beta-blocker was added, he had bronchospasm, changed to Bystolic. \par \par There is no prior history of a clinical myocardial infarction, coronary revascularization, symptomatic congestive heart failure, rheumatic heart disease, atrial fibrillation, cerebrovascular disease or renal insufficiency. \par \par Previously seen in the emergency room for presumed panic attack including palpitations and the sense of tightness in his chest described as bronchospasm. He is now off the bystolic. \par \par Past testing for reference: \par CTA with nonobstructive disease moderate coronary calcification aorta 4 cm\par Blood work October 2020 elevated metanephrine was seen at [place] [facility] told unremarkable \par last cholesterol 99 LDL 54 HDL 38 \par Abdominal ultrasound September 2020 no aneurysm mild to moderate atherosclerosis \par Echocardiogram September 2020 normal left ventricular function aortic root 4 cm\par May 2020 potassium 3.9 creatinine 0.7\par Carotid Doppler November 2019 mild nonobstructive disease bilaterally\par Exercise tolerance test 11/18. Exercise 7 minutes. Peak heart rate 132. Normal cardiovascular response. No ischemia. \par Outside stress test [facility]. January 2016. No ischemia.\par

## 2021-03-05 NOTE — PHYSICAL EXAM
[General Appearance - Well Developed] : well developed [Normal Appearance] : normal appearance [Well Groomed] : well groomed [General Appearance - Well Nourished] : well nourished [No Deformities] : no deformities [General Appearance - In No Acute Distress] : no acute distress [Normal Conjunctiva] : the conjunctiva exhibited no abnormalities [Eyelids - No Xanthelasma] : the eyelids demonstrated no xanthelasmas [No Oral Pallor] : no oral pallor [No Oral Cyanosis] : no oral cyanosis [Normal Jugular Venous A Waves Present] : normal jugular venous A waves present [Normal Jugular Venous V Waves Present] : normal jugular venous V waves present [No Jugular Venous Wheat A Waves] : no jugular venous wheat A waves [Respiration, Rhythm And Depth] : normal respiratory rhythm and effort [Exaggerated Use Of Accessory Muscles For Inspiration] : no accessory muscle use [Auscultation Breath Sounds / Voice Sounds] : lungs were clear to auscultation bilaterally [Heart Rate And Rhythm] : heart rate and rhythm were normal [Heart Sounds] : normal S1 and S2 [Murmurs] : no murmurs present [Abdomen Soft] : soft [Abdomen Tenderness] : non-tender [Abdomen Mass (___ Cm)] : no abdominal mass palpated [Abnormal Walk] : normal gait [Gait - Sufficient For Exercise Testing] : the gait was sufficient for exercise testing [Nail Clubbing] : no clubbing of the fingernails [Cyanosis, Localized] : no localized cyanosis [Petechial Hemorrhages (___cm)] : no petechial hemorrhages [Skin Color & Pigmentation] : normal skin color and pigmentation [] : no rash [No Venous Stasis] : no venous stasis [Skin Lesions] : no skin lesions [No Skin Ulcers] : no skin ulcer [No Xanthoma] : no  xanthoma was observed [Oriented To Time, Place, And Person] : oriented to person, place, and time [Affect] : the affect was normal [Mood] : the mood was normal [No Anxiety] : not feeling anxious [Edema] : no peripheral edema present

## 2021-03-05 NOTE — ASSESSMENT
[FreeTextEntry1] : AMANUEL GARVIN is a 75 year old M who presents today Mar 05, 2021 for preoperative cardiac clearance for prostate surgery at [facility] with Dr. Clayton on 3/22/21.\par \par At present, there are no active cardiac conditions. \par No recent unstable coronary syndromes, decompensated heart failure, severe valvular heart disease or significant dysrhythmias.  \par Baseline functional status is acceptable.    \par The clinical benefit of the proposed procedure outweighs the associated cardiovascular risk.  \par Risk not attenuated with further CV testing.  \par Prior testing as outlined above.\par Optimized from a cardiovascular perspective.\par Not on antiplatelet rx. \par DVT ppx per protocol \par \par Aortic dilatation 4.2 cm. \par Ectasia of abdominal aorta\par Fhx AAA\par Increased dimensions related to body size note 74 in\par Stable\par Blood pressure control.\par No strenuous isometric exercise. Continue surveillance monitoring. \par  \par Hypertensive heart disease. Mild left ventricular hypertrophy. Normal left ventricular function.\par Blood pressure controlled today. \par Intolerance to beta blocker\par No contraindication to PDE inhibitor\par Monitor blood pressure at home\par Continue current medical therapy. \par \par Dyslipidemia with low HDL.\par Coronary/Aorta atherosclerosis. Nonobstructive findings. \par No angina. Continue medical therapy. \par Daily statin therapy. \par increase atorvastatin \par Intolerant of aspirin therapy. Consider re-addressing this in followup. \par \par Discussed adjunctive lifestyle measures, low-sodium diet, regular aerobic exercise, stress reduction. \par \par Please do not hesitate to call for any questions or concerns. \par \par Sincerely,\par \par SAMY Valenzuela\par Patients history, testing, and plan reviewed with supervising MD: Dr. Dante Fisher

## 2021-03-05 NOTE — ADDENDUM
[FreeTextEntry1] : Please note the patient was seen and examined with NP Ciarra Kent\serena I was physically present during the service of the patient and personally examined the patient. \serena I was directly involved in the management plan and recommendations of the care provided to the patient. \serena I personally reviewed the history and physical examination as documented by the NP above.\par 03/05/2021\par

## 2021-04-14 ENCOUNTER — EMERGENCY (EMERGENCY)
Facility: HOSPITAL | Age: 76
LOS: 1 days | End: 2021-04-14
Admitting: EMERGENCY MEDICINE
Payer: MEDICARE

## 2021-04-14 PROCEDURE — 99284 EMERGENCY DEPT VISIT MOD MDM: CPT

## 2021-07-09 ENCOUNTER — NON-APPOINTMENT (OUTPATIENT)
Age: 76
End: 2021-07-09

## 2021-07-09 ENCOUNTER — APPOINTMENT (OUTPATIENT)
Dept: CARDIOLOGY | Facility: CLINIC | Age: 76
End: 2021-07-09
Payer: MEDICARE

## 2021-07-09 VITALS
TEMPERATURE: 98 F | SYSTOLIC BLOOD PRESSURE: 108 MMHG | HEART RATE: 78 BPM | HEIGHT: 74 IN | OXYGEN SATURATION: 99 % | WEIGHT: 201 LBS | DIASTOLIC BLOOD PRESSURE: 52 MMHG | BODY MASS INDEX: 25.8 KG/M2

## 2021-07-09 PROCEDURE — 99214 OFFICE O/P EST MOD 30 MIN: CPT

## 2021-07-09 PROCEDURE — 93000 ELECTROCARDIOGRAM COMPLETE: CPT

## 2021-07-09 NOTE — REVIEW OF SYSTEMS
[Chest Discomfort] : chest discomfort [Heartburn] : heartburn [Hematuria] : hematuria [Negative] : Heme/Lymph

## 2021-07-09 NOTE — REASON FOR VISIT
[Hyperlipidemia] : hyperlipidemia [Hypertension] : hypertension [Symptom and Test Evaluation] : symptom and test evaluation

## 2021-07-16 NOTE — ASSESSMENT
[FreeTextEntry1] : recent blood work and EKG have been reviewed. \par Chest discomfort.  Typical and atypical features.  History of coronary calcification and aortic dilatation.  \par Stress test and echocardiogram have been recommended.  \par Clinical follow-up will be scheduled after noninvasive testing\par \par Aortic dilatation 4.2 cm. \par Ectasia of abdominal aorta\par Fhx AAA\par Increased dimensions related to body size note 74 in\par Blood pressure control.\par No strenuous isometric exercise\par Continue surveillance monitoring. \par  \par Hypertensive heart disease. Mild left ventricular hypertrophy. Normal left ventricular function.\par Blood pressure controlled today. \par Intolerance to beta blocker\par No contraindication to PDE inhibitor\par Monitor blood pressure at home\par Continue current medical therapy. \par \par Dyslipidemia with low HDL.\par Coronary/Aorta atherosclerosis. \par Continue medical therapy. \par Daily statin therapy. \par \par Discussed adjunctive lifestyle measures, low-sodium diet, regular aerobic exercise, stress reduction.

## 2021-07-16 NOTE — HISTORY OF PRESENT ILLNESS
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 \par Prior cardiologist, Dr. Troy Ordonez, [facility]. \par \par h/o TAA, HHD, dyslipidemia, anxiety, nonobstructive CAC and aortic atherosclerosis on medical therapy. \par \par Remains active\par No exertional dyspnea, palps, dizziness, or syncope. \par \par Episodes of hypertension. Initially diagnosed with high blood pressure 5+ years ago. He is aware of his hypertension when he feels unwell with increase in PVCs. Asx from arrhythmia standpoint at this time. BP is very well controlled. \par \par Prior visits to the emergency room and stress test was performed in [place] and [place] which were within normal limits. He does endorse social stress at that time.\par \par At one point he was on ramipril. The dose of ramipril was increased. Subsequently a beta-blocker was added, he had bronchospasm, changed to Bystolic. \par \par There is no prior history of a clinical myocardial infarction, coronary revascularization, symptomatic congestive heart failure, rheumatic heart disease, atrial fibrillation, cerebrovascular disease or renal insufficiency. \par \par he does have symptoms of chest discomfort.  At times this radiates to his left arm.  He is taking a PPI.  He recently underwent GI evaluation including endoscopy which demonstrated inflammation.  \par \par His prostate procedure at [place] was complicated.  He had hematuria and subsequent urinary retention.  Subsequent urethral stricture.  Now follows at [facility] urology.  \par \par EKG demonstrates normal sinus rhythm \par Last blood work April 2021 hemoglobin 13.1 creatinine 0.6 \par \par Past testing for reference: \par ETT 2018 no evidence of ischemia. \par CTA with nonobstructive disease moderate coronary calcification aorta 4 cm\par Abdominal ultrasound September 2020 no aneurysm mild to moderate atherosclerosis \par Echocardiogram September 2020 normal left ventricular function aortic root 4 cm\par Carotid Doppler November 2019 mild nonobstructive disease bilaterally\par Outside stress test [facility]. January 2016. No ischemia.\par \par

## 2021-07-16 NOTE — ADDENDUM
[FreeTextEntry1] : Preoperative status [prostate bx] \par 07/09/2021 \par At present, there are no active cardiac conditions. \par No recent unstable coronary syndromes, decompensated heart failure, severe valvular heart disease or significant dysrhythmias.  \par Baseline functional status is acceptable .    \par The clinical benefit of the proposed procedure outweighs the associated cardiovascular risk.  \par Risk not attenuated with further CV testing.  \par Prior testing as outlined above.\par Optimized from a cardiovascular perspective.\par

## 2021-07-29 ENCOUNTER — APPOINTMENT (OUTPATIENT)
Dept: CT IMAGING | Facility: CLINIC | Age: 76
End: 2021-07-29
Payer: MEDICARE

## 2021-07-29 PROCEDURE — 71250 CT THORAX DX C-: CPT | Mod: MH

## 2021-08-30 ENCOUNTER — APPOINTMENT (OUTPATIENT)
Dept: CARDIOLOGY | Facility: CLINIC | Age: 76
End: 2021-08-30

## 2021-08-31 ENCOUNTER — APPOINTMENT (OUTPATIENT)
Dept: CARDIOLOGY | Facility: CLINIC | Age: 76
End: 2021-08-31
Payer: MEDICARE

## 2021-08-31 PROCEDURE — 93306 TTE W/DOPPLER COMPLETE: CPT

## 2021-09-13 ENCOUNTER — APPOINTMENT (OUTPATIENT)
Dept: CARDIOLOGY | Facility: CLINIC | Age: 76
End: 2021-09-13
Payer: MEDICARE

## 2021-09-13 DIAGNOSIS — R07.9 CHEST PAIN, UNSPECIFIED: ICD-10-CM

## 2021-09-13 PROCEDURE — 93015 CV STRESS TEST SUPVJ I&R: CPT

## 2021-09-14 ENCOUNTER — APPOINTMENT (OUTPATIENT)
Dept: CARDIOLOGY | Facility: CLINIC | Age: 76
End: 2021-09-14
Payer: MEDICARE

## 2021-09-14 VITALS
WEIGHT: 201 LBS | OXYGEN SATURATION: 98 % | SYSTOLIC BLOOD PRESSURE: 118 MMHG | HEART RATE: 73 BPM | HEIGHT: 74 IN | DIASTOLIC BLOOD PRESSURE: 60 MMHG | BODY MASS INDEX: 25.8 KG/M2 | TEMPERATURE: 98 F

## 2021-09-14 DIAGNOSIS — I49.3 VENTRICULAR PREMATURE DEPOLARIZATION: ICD-10-CM

## 2021-09-14 DIAGNOSIS — I11.9 HYPERTENSIVE HEART DISEASE W/OUT HEART FAILURE: ICD-10-CM

## 2021-09-14 DIAGNOSIS — I70.0 ATHEROSCLEROSIS OF AORTA: ICD-10-CM

## 2021-09-14 PROCEDURE — 99214 OFFICE O/P EST MOD 30 MIN: CPT

## 2021-09-14 PROCEDURE — 93242 EXT ECG>48HR<7D RECORDING: CPT

## 2021-09-20 PROBLEM — I11.9 HYPERTENSIVE HEART DISEASE WITHOUT CHF: Status: ACTIVE | Noted: 2018-05-10

## 2021-09-20 PROBLEM — I70.0 AORTIC ATHEROSCLEROSIS: Status: ACTIVE | Noted: 2017-11-09

## 2021-09-20 NOTE — REASON FOR VISIT
[Symptom and Test Evaluation] : symptom and test evaluation [Hyperlipidemia] : hyperlipidemia [Hypertension] : hypertension [Coronary Artery Disease] : coronary artery disease [Carotid, Aortic and Peripheral Vascular Disease] : carotid, aortic and peripheral vascular disease

## 2021-09-20 NOTE — ASSESSMENT
[FreeTextEntry1] : Chest discomfort.  atypical.  History of coronary calcification and aortic dilatation.  \par Stress test and echocardiogram reviewed\par monitor aortic dimensions \par monitor will be applied to evaluate burden of ectopy\par clinical follow-up will be scheduled after monitor.  \par \par Aortic dilatation 4.2 cm. \par Ectasia of abdominal aorta\par Fhx AAA\par Increased dimensions related to body size note 74 in\par Blood pressure control.\par No strenuous isometric exercise\par Continue surveillance monitoring. \par  \par Hypertensive heart disease. Mild left ventricular hypertrophy. Normal left ventricular function.\par Blood pressure controlled today. \par Intolerance to beta blocker\par No contraindication to PDE inhibitor\par Monitor blood pressure at home\par Continue current medical therapy. \par \par Dyslipidemia with low HDL.\par Coronary/Aorta atherosclerosis. \par Continue medical therapy. \par Daily statin therapy. \par \par Discussed adjunctive lifestyle measures, low-sodium diet, regular aerobic exercise, stress reduction. \par \par ** Requests if he were to have any procedures he would want to go to [facility]**

## 2021-09-20 NOTE — HISTORY OF PRESENT ILLNESS
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 \par Prior cardiologist, Dr. Troy Ordonez, [facility]. \par \par h/o TAA, HHD, dyslipidemia, anxiety, nonobstructive CAC and aortic atherosclerosis\par \par Remains active\par No exertional dyspnea, dizziness, or syncope. \par \par Episodes of hypertension. Initially diagnosed with high blood pressure 5+ years ago. He is aware of his hypertension when he feels unwell with increase in PVCs.  BP typically very well controlled. \par \par Prior visits to the emergency room and stress test was performed in [place] and [place] which were within normal limits. He does endorse social stress at that time.\par \par At one point he was on ramipril. The dose of ramipril was increased. Subsequently a beta-blocker was added, he had bronchospasm, changed to Bystolic. \par \par There is no prior history of a clinical myocardial infarction, coronary revascularization, symptomatic congestive heart failure, rheumatic heart disease, atrial fibrillation, cerebrovascular disease or renal insufficiency. \par \par Recently he had symptoms of ectopy he was aware of bigeminy \par Cut the tea out of his diet \par \par echocardiogram demonstrates normal left ventricular function aortic root 4.2 no significant valvular heart disease possible new wall motion abnormality \par recent exercise test 7 minutes normal hemodynamic response no symptoms no significant ectopy no ischemia \par EKG demonstrates normal sinus rhythm \par Last blood work April 2021 hemoglobin 13.1 creatinine 0.6 \par \par Past testing for reference: \par CTA with nonobstructive disease moderate coronary calcification aorta 4 cm\par Abdominal ultrasound September 2020 no aneurysm mild to moderate atherosclerosis \par Carotid Doppler November 2019 mild nonobstructive disease bilaterally\par Outside stress test [facility]. January 2016. No ischemia.\par

## 2021-09-29 PROCEDURE — 93244 EXT ECG>48HR<7D REV&INTERPJ: CPT

## 2021-11-04 ENCOUNTER — OUTPATIENT (OUTPATIENT)
Dept: OUTPATIENT SERVICES | Facility: HOSPITAL | Age: 76
LOS: 1 days | End: 2021-11-04

## 2021-11-10 ENCOUNTER — NON-APPOINTMENT (OUTPATIENT)
Age: 76
End: 2021-11-10

## 2022-01-19 ENCOUNTER — APPOINTMENT (OUTPATIENT)
Dept: CARDIOLOGY | Facility: CLINIC | Age: 77
End: 2022-01-19
Payer: MEDICARE

## 2022-01-19 ENCOUNTER — NON-APPOINTMENT (OUTPATIENT)
Age: 77
End: 2022-01-19

## 2022-01-19 VITALS
WEIGHT: 199 LBS | HEART RATE: 75 BPM | BODY MASS INDEX: 25.54 KG/M2 | HEIGHT: 74 IN | SYSTOLIC BLOOD PRESSURE: 122 MMHG | OXYGEN SATURATION: 96 % | DIASTOLIC BLOOD PRESSURE: 60 MMHG

## 2022-01-19 DIAGNOSIS — I71.2 THORACIC AORTIC ANEURYSM, W/OUT RUPTURE: ICD-10-CM

## 2022-01-19 DIAGNOSIS — I25.84 ATHEROSCLEROTIC HEART DISEASE OF NATIVE CORONARY ARTERY W/OUT ANGINA PECTORIS: ICD-10-CM

## 2022-01-19 DIAGNOSIS — I25.10 ATHEROSCLEROTIC HEART DISEASE OF NATIVE CORONARY ARTERY W/OUT ANGINA PECTORIS: ICD-10-CM

## 2022-01-19 DIAGNOSIS — E78.2 MIXED HYPERLIPIDEMIA: ICD-10-CM

## 2022-01-19 DIAGNOSIS — I10 ESSENTIAL (PRIMARY) HYPERTENSION: ICD-10-CM

## 2022-01-19 PROCEDURE — 99214 OFFICE O/P EST MOD 30 MIN: CPT | Mod: 25

## 2022-01-19 PROCEDURE — 93000 ELECTROCARDIOGRAM COMPLETE: CPT

## 2022-01-19 RX ORDER — SILODOSIN 8 MG/1
8 CAPSULE ORAL DAILY
Refills: 0 | Status: DISCONTINUED | COMMUNITY
End: 2022-01-19

## 2022-01-19 RX ORDER — FINASTERIDE 5 MG/1
5 TABLET, FILM COATED ORAL DAILY
Refills: 0 | Status: DISCONTINUED | COMMUNITY
End: 2022-01-19

## 2022-01-19 NOTE — DISCUSSION/SUMMARY
[FreeTextEntry1] : Hypertension: since having the surgery patient's BP has been having running normal off antihypertensive medication. Patient took Cialis 5mg daily post operatively and BP dropped to 80s systolic. Cialis continues to be on hold until patient speaks with surgeon. Recommend holding antihypertensive medications and patient will continue to monitor his BP at home. He will reinstitute ramipril at 10mg daily if he notes his SBP consistently in the 130s going forward. \par \par TAA: periodic echo surveillance. Goal BP less than 130/80.\par \par Coronary artery atherosclerosis: continue statin therapy.\par \par Follow up with Dr. Fisher in 3 months.

## 2022-01-19 NOTE — HISTORY OF PRESENT ILLNESS
[FreeTextEntry1] : 76 year old male with coronary artery calcifications, HTN, HLD, TAA presents s/p radical robotic prostatectomy on January 6, 2022. Patient was started on Cialis post operatively 5mg. Patient took the medication the other day and his BP dropped into the high 80s systolic. Patient held his BP medications. Laid on couch and drank plenty of water. His BP came up. Since his surgery his BP has been normal off all antihypertensive medications. He is holding the Cialis until he speaks with the surgeon.\par \par Patient has no chest pain, SOB, or palpitations. No lightheadedness or syncope.

## 2022-05-06 ENCOUNTER — APPOINTMENT (OUTPATIENT)
Dept: CARDIOLOGY | Facility: CLINIC | Age: 77
End: 2022-05-06

## 2022-09-12 RX ORDER — ATORVASTATIN CALCIUM 40 MG/1
40 TABLET, FILM COATED ORAL
Qty: 30 | Refills: 1 | Status: ACTIVE | COMMUNITY
Start: 2018-05-24 | End: 1900-01-01

## 2023-02-15 RX ORDER — RAMIPRIL 10 MG/1
10 CAPSULE ORAL
Qty: 180 | Refills: 0 | Status: ACTIVE | COMMUNITY
Start: 2023-02-15 | End: 1900-01-01

## 2024-07-03 ENCOUNTER — NON-APPOINTMENT (OUTPATIENT)
Age: 79
End: 2024-07-03

## 2024-07-20 ENCOUNTER — NON-APPOINTMENT (OUTPATIENT)
Age: 79
End: 2024-07-20

## 2024-07-24 ENCOUNTER — NON-APPOINTMENT (OUTPATIENT)
Age: 79
End: 2024-07-24

## 2025-06-20 ENCOUNTER — NON-APPOINTMENT (OUTPATIENT)
Age: 80
End: 2025-06-20

## 2025-06-25 ENCOUNTER — NON-APPOINTMENT (OUTPATIENT)
Age: 80
End: 2025-06-25